# Patient Record
Sex: MALE
[De-identification: names, ages, dates, MRNs, and addresses within clinical notes are randomized per-mention and may not be internally consistent; named-entity substitution may affect disease eponyms.]

---

## 2024-09-30 ENCOUNTER — APPOINTMENT (OUTPATIENT)
Dept: ORTHOPEDIC SURGERY | Facility: CLINIC | Age: 39
End: 2024-09-30
Payer: COMMERCIAL

## 2024-09-30 DIAGNOSIS — Z78.9 OTHER SPECIFIED HEALTH STATUS: ICD-10-CM

## 2024-09-30 DIAGNOSIS — R22.32 LOCALIZED SWELLING, MASS AND LUMP, LEFT UPPER LIMB: ICD-10-CM

## 2024-09-30 PROBLEM — Z00.00 ENCOUNTER FOR PREVENTIVE HEALTH EXAMINATION: Status: ACTIVE | Noted: 2024-09-30

## 2024-09-30 PROCEDURE — 73110 X-RAY EXAM OF WRIST: CPT | Mod: 50

## 2024-09-30 PROCEDURE — 99204 OFFICE O/P NEW MOD 45 MIN: CPT

## 2024-09-30 PROCEDURE — 76882 US LMTD JT/FCL EVL NVASC XTR: CPT

## 2024-09-30 NOTE — PHYSICAL EXAM
[de-identified] : Physical exam shows the patient to be alert and oriented x3, capable of ambulation. The patient is well-developed and well-nourished in no apparent respiratory distress. Majority of the skin is intact bilaterally in the upper extremities without lymphadenopathy at the elbows.  There is a 1 x 1 cm soft tissue mass in the dorsal aspect of the left wrist between the third and fourth dorsal compartments overlying the scapholunate ligament.  There is no tenderness over the scaphoid lunate or triquetrum and no evidence of instability negative Joseph test bilaterally.  Flexion/extension is 80/60 symmetric bilaterally pronation/supination 80/80 symmetric bilaterally  No tenderness over the flexor or extensor tendons to the wrist and digits no evidence of joint or tendon stability.  There is good capillary refill of the digits bilaterally.There is no masses or sensitivity over the median and ulnar nerves at the level of the wrist. There is a negative Tinel's and negative Phalen's sign bilaterally. The sensation is grossly intact bilaterally. [de-identified] : PA and lateral of both wrist shows no evidence of arthritis with joint spaces well-preserved and no soft tissue calcifications.  Scapholunate ligament stress views in neutral radial and ulnar deviation shows no evidence of carpal instability as compared to the opposite side.  Ultrasound of the left wrist shows no evidence of instability scapholunate ligament upon stress but there is a area of hypoechoic signal just dorsal to the scapholunate ligament which appears to be well encapsulated.

## 2024-09-30 NOTE — ASSESSMENT
[FreeTextEntry1] : 1 x 1 cm soft tissue mass dorsal aspect left wrist clinically resembling a ganglion cyst coming from the scapholunate ligament.  Differential diagnosis was discussed as well as options ranging from conservative management, aspiration and injection, further workup with MRI, or surgical excision.  Risk associated with each option discussed and all questions answered.  Patient elected to proceed with ergonomic modifications and a home program which was outlined as well as Coban wraps.  If he elects to proceed with therapy or aspiration he will contact us.  If he elects to proceed with further workup we will contact us and we will proceed with an MRI.  If symptoms resolve he can follow on an as-needed basis.

## 2024-09-30 NOTE — HISTORY OF PRESENT ILLNESS
[FreeTextEntry1] : Patient presents with left dorsal wrist soft tissue mass which she noticed 6 months ago.  Patient denies any specific injury and he states that the mass fluctuates in size.  No forms of treatment have been done.   Patient is an avid cyclist and does do a lot of weight lifting.  Symptoms are increased by forced extension but denies any significant history of trauma.

## 2024-10-21 ENCOUNTER — APPOINTMENT (OUTPATIENT)
Dept: ORTHOPEDIC SURGERY | Facility: CLINIC | Age: 39
End: 2024-10-21
Payer: COMMERCIAL

## 2024-10-21 VITALS — BODY MASS INDEX: 20.04 KG/M2 | HEIGHT: 70 IN | RESPIRATION RATE: 16 BRPM | WEIGHT: 140 LBS

## 2024-10-21 DIAGNOSIS — R22.32 LOCALIZED SWELLING, MASS AND LUMP, LEFT UPPER LIMB: ICD-10-CM

## 2024-10-21 PROCEDURE — 99214 OFFICE O/P EST MOD 30 MIN: CPT | Mod: 25

## 2024-10-21 PROCEDURE — 20612 ASPIRATE/INJ GANGLION CYST: CPT | Mod: LT

## 2025-01-30 ENCOUNTER — APPOINTMENT (OUTPATIENT)
Dept: ORTHOPEDIC SURGERY | Facility: CLINIC | Age: 40
End: 2025-01-30

## 2025-01-30 VITALS — BODY MASS INDEX: 20.04 KG/M2 | WEIGHT: 140 LBS | HEIGHT: 70 IN

## 2025-01-30 DIAGNOSIS — M77.11 LATERAL EPICONDYLITIS, RIGHT ELBOW: ICD-10-CM

## 2025-01-30 PROCEDURE — 99214 OFFICE O/P EST MOD 30 MIN: CPT | Mod: 25

## 2025-01-30 PROCEDURE — 20551 NJX 1 TENDON ORIGIN/INSJ: CPT | Mod: RT

## 2025-01-30 PROCEDURE — 73070 X-RAY EXAM OF ELBOW: CPT | Mod: RT

## 2025-05-10 ENCOUNTER — NON-APPOINTMENT (OUTPATIENT)
Age: 40
End: 2025-05-10